# Patient Record
Sex: MALE | Race: WHITE | HISPANIC OR LATINO | URBAN - METROPOLITAN AREA
[De-identification: names, ages, dates, MRNs, and addresses within clinical notes are randomized per-mention and may not be internally consistent; named-entity substitution may affect disease eponyms.]

---

## 2022-02-10 DIAGNOSIS — M54.50 LOW BACK PAIN: Primary | ICD-10-CM

## 2022-02-11 ENCOUNTER — HOSPITAL ENCOUNTER (OUTPATIENT)
Dept: RADIOLOGY | Facility: HOSPITAL | Age: 57
Discharge: HOME | End: 2022-02-11
Attending: INTERNAL MEDICINE
Payer: COMMERCIAL

## 2022-02-11 DIAGNOSIS — M54.50 LOW BACK PAIN: ICD-10-CM

## 2022-02-11 PROCEDURE — 72050 XR CERVICAL SPINE AP LAT WITH FLEX EXTEN: ICD-10-PCS | Mod: 26,,, | Performed by: RADIOLOGY

## 2022-02-11 PROCEDURE — 71046 X-RAY EXAM CHEST 2 VIEWS: CPT | Mod: TC,FY

## 2022-02-11 PROCEDURE — 71046 X-RAY EXAM CHEST 2 VIEWS: CPT | Mod: 26,,, | Performed by: RADIOLOGY

## 2022-02-11 PROCEDURE — 72110 X-RAY EXAM L-2 SPINE 4/>VWS: CPT | Mod: 26,,, | Performed by: RADIOLOGY

## 2022-02-11 PROCEDURE — 71046 XR CHEST PA AND LATERAL: ICD-10-PCS | Mod: 26,,, | Performed by: RADIOLOGY

## 2022-02-11 PROCEDURE — 72050 X-RAY EXAM NECK SPINE 4/5VWS: CPT | Mod: 26,,, | Performed by: RADIOLOGY

## 2022-02-11 PROCEDURE — 72110 X-RAY EXAM L-2 SPINE 4/>VWS: CPT | Mod: TC,FY

## 2022-02-11 PROCEDURE — 72050 X-RAY EXAM NECK SPINE 4/5VWS: CPT | Mod: TC,FY

## 2022-02-11 PROCEDURE — 72110 XR LUMBAR SPINE AP AND LAT WITH FLEX/EXT: ICD-10-PCS | Mod: 26,,, | Performed by: RADIOLOGY

## 2025-01-04 ENCOUNTER — HOSPITAL ENCOUNTER (EMERGENCY)
Facility: HOSPITAL | Age: 60
Discharge: HOME | End: 2025-01-04
Attending: EMERGENCY MEDICINE
Payer: COMMERCIAL

## 2025-01-04 VITALS
RESPIRATION RATE: 18 BRPM | OXYGEN SATURATION: 96 % | DIASTOLIC BLOOD PRESSURE: 71 MMHG | SYSTOLIC BLOOD PRESSURE: 131 MMHG | WEIGHT: 235 LBS | HEART RATE: 72 BPM | HEIGHT: 70 IN | TEMPERATURE: 98 F | BODY MASS INDEX: 33.64 KG/M2
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
## 2025-01-04 DIAGNOSIS — S20.211A RIB CONTUSION, RIGHT, INITIAL ENCOUNTER: Primary | ICD-10-CM

## 2025-01-04 PROCEDURE — 99283 EMERGENCY DEPT VISIT LOW MDM: CPT | Mod: 25

## 2025-01-04 RX ORDER — OXYCODONE AND ACETAMINOPHEN 5; 325 MG/1; MG/1
1 TABLET ORAL EVERY 4 HOURS PRN
Qty: 15 TABLET | Refills: 0 | Status: SHIPPED | OUTPATIENT
Start: 2025-01-04

## 2025-01-04 RX ORDER — IBUPROFEN 600 MG/1
600 TABLET ORAL EVERY 6 HOURS PRN
Qty: 20 TABLET | Refills: 0 | Status: SHIPPED | OUTPATIENT
Start: 2025-01-04

## 2025-01-04 NOTE — Clinical Note
"Enoc Orellana"Bree was seen and treated in our emergency department on 1/4/2025.  He may return to work on 01/08/2025.       If you have any questions or concerns, please don't hesitate to call.      Dariel Hook MD"

## 2025-01-04 NOTE — ED NOTES
Patient reports right rib pain secondary to a mechanical fall in the shower last night. Patient denies loss of consciousness, striking his head, blood thinners. Patient states pain worsens with movement, difficulty sleeping.

## 2025-01-04 NOTE — ED PROVIDER NOTES
Encounter Date: 1/4/2025       History     Chief Complaint   Patient presents with    Fall     Patient reports right rib pain secondary to a mechanical fall last night while in the shower. Patient denies striking his head, denies loss of consciousness.     Patient is a 59-year-old male who slipped and fell in the shower last night.  Patient struck his right flank on the edge of the tub.  There was no head trauma or loss of consciousness.  His pain is minimally increased with deep inspiration.  He denies shortness of breath.      Review of patient's allergies indicates:  No Known Allergies  History reviewed. No pertinent past medical history.  Past Surgical History:   Procedure Laterality Date    HERNIA REPAIR Bilateral 2003     No family history on file.  Social History     Tobacco Use    Smoking status: Some Days     Types: Cigars    Smokeless tobacco: Never   Substance Use Topics    Alcohol use: Yes     Comment: socially    Drug use: Never     Review of Systems   Respiratory:  Negative for shortness of breath.    Genitourinary:  Positive for flank pain.   Neurological:  Negative for dizziness and syncope.   All other systems reviewed and are negative.      Physical Exam     Initial Vitals [01/04/25 0718]   BP Pulse Resp Temp SpO2   139/75 66 18 98.3 °F (36.8 °C) 96 %      MAP       --         Physical Exam    Nursing note and vitals reviewed.  Constitutional: No distress.   HENT:   Head: Atraumatic.   Cardiovascular:  Normal rate and regular rhythm.           Pulmonary/Chest: Breath sounds normal.   There is tenderness of the right lower lateral chest wall without overlying erythema or bruising.  No palpable rib deformity.  No crepitance.   Abdominal: Abdomen is soft. There is no abdominal tenderness.     Neurological: He is alert and oriented to person, place, and time.   Skin: Skin is warm and dry.         ED Course   Procedures  Labs Reviewed - No data to display       Imaging Results              X-Ray Ribs 2  View Right (Final result)  Result time 01/04/25 08:17:05      Final result by Mere Ocampo MD (01/04/25 08:17:05)                   Impression:      No evidence for displaced right sided rib fractures.      Electronically signed by: Mere Ocampo MD  Date:    01/04/2025  Time:    08:17               Narrative:    EXAMINATION:  XR RIBS 2 VIEW RIGHT    CLINICAL HISTORY:  Unspecified fall, initial encounter    TECHNIQUE:  Two views of the right ribs    COMPARISON:  None    FINDINGS:  Examination of the right chest for ribs demonstrates no evidence for pneumothorax.  No evidence for pleural reaction.  No evidence for displaced rib fractures.  Nondisplaced rib fractures are not excluded and if indicated, follow-up examination in 7 to 10 days to demonstrate healing callus formation associated with nonvisualized / nondisplaced rib fractures.                                       Medications - No data to display  Medical Decision Making  Emergent evaluation of a 59-year-old male who fell in his shower yesterday hitting the right lateral chest wall in the edge of the bathtub.  Rib x-ray show no evidence of fracture or pneumothorax.  Most likely etiology is a rib contusion.  He will be placed on pain medication for this and will follow up if not showing improvement in 1 week.  He should return immediately to the ED if any shortness of breath develops.    Amount and/or Complexity of Data Reviewed  Radiology: ordered.     Details: Right rib x-rays without evidence of fracture.    Risk  Prescription drug management.                                      Clinical Impression:  Final diagnoses:  [W19.XXXA] Fall  [S20.211A] Rib contusion, right, initial encounter (Primary)          ED Disposition Condition    Discharge Stable          ED Prescriptions       Medication Sig Dispense Start Date End Date Auth. Provider    ibuprofen (ADVIL,MOTRIN) 600 MG tablet Take 1 tablet (600 mg total) by mouth every 6 (six) hours as needed for  Pain. 20 tablet 1/4/2025 -- Dariel Hook MD    oxyCODONE-acetaminophen (PERCOCET) 5-325 mg per tablet Take 1 tablet by mouth every 4 (four) hours as needed for Pain. 15 tablet 1/4/2025 -- Dariel Hook MD          Follow-up Information       Follow up With Specialties Details Why Contact Info    Adeola Coker MD Internal Medicine  As needed [address] [facility]  [address]  [employer] [facility] [address] [phone]      [facility] Emergency Dept Emergency Medicine  If symptoms worsen [address] [facility] [address] [phone]             Dariel Hook MD  01/04/25 6868